# Patient Record
(demographics unavailable — no encounter records)

---

## 2025-03-17 NOTE — PHYSICAL EXAM
[Chaperone Declined] : Patient declined chaperone [Appropriately responsive] : appropriately responsive [No Acute Distress] : no acute distress [No Lymphadenopathy] : no lymphadenopathy [Soft] : soft [Non-tender] : non-tender [Oriented x3] : oriented x3 [Atrophy] : atrophy [Normal] : normal [FreeTextEntry5] : digital only [FreeTextEntry6] : digital only

## 2025-03-17 NOTE — HISTORY OF PRESENT ILLNESS
[FreeTextEntry1] : 76yo  w ithc x 1 + wks  no change in soap, meds, clothes  note increased citrus fruits  recent abnormal UA , few bacteria , not treated

## 2025-03-17 NOTE — DISCUSSION/SUMMARY
[FreeTextEntry1] : suspect urinary irritation from acidified urine  f/o vaginitis and UTI  ucx and arrirm

## 2025-03-17 NOTE — REVIEW OF SYSTEMS
[Urgency] : no urgency [Incontinence] : no incontinence [Dysuria] : no dysuria [Genital Rash/Irritation] : genital rash/irritation [Negative] : Heme/Lymph

## 2025-06-26 NOTE — HISTORY OF PRESENT ILLNESS
[FreeTextEntry1] : 75-year-old female in good health has been experiencing intermittent loose watery stools for several months.  Similar issues over 20 years ago no specific diagnosis.  No particular food triggers.  Generally has normal bowel movements but every few days can have a urgent bowel movements multiple times in the morning without pain/cramps and subsequently no further bowel movement for the rest of the day.  Activated charcoal seems to help.  Last colonoscopy about 10 years ago reportedly normal.

## 2025-06-26 NOTE — ASSESSMENT
[FreeTextEntry1] : Impression: IBS-D with intermittent diarrhea only.  The fact that diarrhea occurs only in the morning and resolves after multiple loose bowel movements suggest dietary triggers such as lactose/FODMAPs.  Rule out EPI/IBD/SIBO (unlikely).  Average risk colorectal cancer due for follow-up screening colonoscopy.  Plan: Discussed lactose-free/low FODMAP diet in detail with patient.  Will send labs including celiac markers, CRP, calprotectin and fecal elastase.  Schedule screening colonoscopy for November when patient returns to [place] from her home in [place].  Will prescribe Suprep when it is closer to procedure date